# Patient Record
Sex: MALE | Race: WHITE | Employment: OTHER | URBAN - METROPOLITAN AREA
[De-identification: names, ages, dates, MRNs, and addresses within clinical notes are randomized per-mention and may not be internally consistent; named-entity substitution may affect disease eponyms.]

---

## 2022-07-08 ENCOUNTER — OFFICE VISIT (OUTPATIENT)
Dept: NEUROLOGY | Age: 77
End: 2022-07-08
Payer: MEDICARE

## 2022-07-08 VITALS
BODY MASS INDEX: 36.08 KG/M2 | WEIGHT: 252 LBS | TEMPERATURE: 97.9 F | SYSTOLIC BLOOD PRESSURE: 118 MMHG | HEART RATE: 72 BPM | OXYGEN SATURATION: 96 % | HEIGHT: 70 IN | DIASTOLIC BLOOD PRESSURE: 80 MMHG | RESPIRATION RATE: 18 BRPM

## 2022-07-08 DIAGNOSIS — E53.8 B12 DEFICIENCY: ICD-10-CM

## 2022-07-08 DIAGNOSIS — E55.9 VITAMIN D DEFICIENCY: ICD-10-CM

## 2022-07-08 DIAGNOSIS — M48.061 DEGENERATIVE LUMBAR SPINAL STENOSIS: ICD-10-CM

## 2022-07-08 DIAGNOSIS — E11.42 DIABETIC PERIPHERAL NEUROPATHY ASSOCIATED WITH TYPE 2 DIABETES MELLITUS (HCC): ICD-10-CM

## 2022-07-08 DIAGNOSIS — M96.1 LUMBAR POST-LAMINECTOMY SYNDROME: Primary | ICD-10-CM

## 2022-07-08 DIAGNOSIS — R47.1 DYSARTHRIA: ICD-10-CM

## 2022-07-08 DIAGNOSIS — M48.02 DEGENERATIVE CERVICAL SPINAL STENOSIS: ICD-10-CM

## 2022-07-08 DIAGNOSIS — G72.49 INFLAMMATORY AND IMMUNE MYOPATHIES: ICD-10-CM

## 2022-07-08 DIAGNOSIS — M54.16 LUMBAR BACK PAIN WITH RADICULOPATHY AFFECTING LEFT LOWER EXTREMITY: ICD-10-CM

## 2022-07-08 PROCEDURE — 99205 OFFICE O/P NEW HI 60 MIN: CPT | Performed by: PSYCHIATRY & NEUROLOGY

## 2022-07-08 PROCEDURE — 1123F ACP DISCUSS/DSCN MKR DOCD: CPT | Performed by: PSYCHIATRY & NEUROLOGY

## 2022-07-08 RX ORDER — PRAVASTATIN SODIUM 40 MG/1
1 TABLET ORAL DAILY
COMMUNITY
Start: 2022-01-06

## 2022-07-08 RX ORDER — LISINOPRIL 5 MG/1
TABLET ORAL DAILY
COMMUNITY
Start: 2021-12-09

## 2022-07-08 RX ORDER — UREA 10 %
500 LOTION (ML) TOPICAL DAILY
COMMUNITY

## 2022-07-08 RX ORDER — SILODOSIN 8 MG/1
8 CAPSULE ORAL
COMMUNITY

## 2022-07-08 RX ORDER — MELOXICAM 15 MG/1
15 TABLET ORAL DAILY
COMMUNITY

## 2022-07-08 RX ORDER — POTASSIUM CITRATE 10 MEQ/1
10 TABLET, EXTENDED RELEASE ORAL DAILY
COMMUNITY

## 2022-07-08 RX ORDER — DILTIAZEM HYDROCHLORIDE 360 MG/1
360 CAPSULE, EXTENDED RELEASE ORAL DAILY
COMMUNITY

## 2022-07-08 RX ORDER — LANOLIN ALCOHOL/MO/W.PET/CERES
800 CREAM (GRAM) TOPICAL DAILY
COMMUNITY
End: 2022-07-08

## 2022-07-08 RX ORDER — OMEPRAZOLE 20 MG/1
20 CAPSULE, DELAYED RELEASE ORAL DAILY
COMMUNITY

## 2022-07-08 NOTE — PROGRESS NOTES
Chief Complaint   Patient presents with    New Patient     referred by sister in law for slurred speech , sob, imbalance and drop foot - for 6-12 months - had back surgery at [facility] for severe spinal stenosis     1. Have you been to the ER, urgent care clinic since your last visit? Hospitalized since your last visit? No     2. Have you seen or consulted any other health care providers outside of the [address] [place] [address] since your last visit? Include any pap smears or colon screening.   No

## 2022-07-09 NOTE — PROGRESS NOTES
Consult  REFERRED BY:  None    CHIEF COMPLAINT: Difficulty with left foot drop, some slurred speech and swallowing difficulty, and recent lumbar laminectomy for severe spinal stenosis, and known severe cervical spondylosis at C3-4, and increased gait difficulty and balance problems and falls and COPD and shortness of breath. Subjective:     Deondre Hayes is a 76 y.o. right-handed  male we are seeing as a new patient, for evaluation of multiple neurological problems over the last year. The patient's had gait difficulty, and has apparently been evaluated extensively at [facility], where he had an MRI of the brain done November 2021 just showed mild atrophic changes and age-related changes, but no other significant lesions, had an MRI of the cervical spine done that shows severe spinal stenosis at C3-4 and 2021-year ago, and has an MRI of the lumbar spine done this year that showed severe spinal stenosis L2-3 and T12- L1 and L1-2, for which he had an extensive lumbar laminectomy done because of left foot drop May 2022 that did not get better after surgery, but the numbness in his feet did. He has some bowel continence problems and more frequent urinary urgency ever since he was treated with prostate cancer with radiation seeds. His neurosurgeon has advised him that it may take up to a year to get improvement after his surgery but he seems frustrated, and is not wearing his AFO splint like he should. He has had some difficulty with swallowing and choking and coughing, but has gotten better recently, but he still complains of shortness of breath but had a recent pulmonary function test done that shows he has moderate obstructive airway disease as a cause of his breathing problems done 2 days ago.   He also has had 1 week ago and extensive evaluation by ENT because of his swallowing difficulties, had a completely normal exam apparently, except for some reflux, and a double of his antacid therapy with Prilosec and Pepcid, but found no other structural lesion or swallowing problems or strictures on exam.  He has never had a clear history of stroke or TIA. He does not exercise much. He has had a previous left knee replacement. He has physical therapy being set up in [place]. He has been followed by a neurologist there for 6 months to a year and no other neurologic abnormality has been diagnosed. He had an EMG study done of his legs at [facility] and May 2022 and no evidence of motor neuron disease or any other problem was noted on the EMG except for his lumbar radiculopathies. On looking at his lab studies, they look relatively unremarkable except that his blood sugars have been running somewhat high. Has not had a recent hemoglobin A1c. They think his family doctor has done some type of muscle disease test just recently and they are going to try to get the results for us. I might think it is probably a myasthenia test.  His symptoms however do not fatigue and come and go which would be against myasthenia. He had no double vision either. He has prominent rotator cuff problems in his shoulders and some mild proximal weakness in the arms from that. In 2018 because of dizziness he had MRI of the brain and internal auditory canals that were essentially normal.  He does not complain much of weakness or numbness in his arms, manages his legs. His EMG may have suggested a mild length dependent sensorimotor polyneuropathy which could not be ruled out. It also showed severe L5-S1 radiculopathies worse in the left leg. He has not had a neuropathy or neuromuscular work-up, but I can see all of the [facility] notes. It sounds like his evaluation has been fairly extensive already. Past Medical History:   Diagnosis Date    [facility] obstructive [facility] disease) [facility]     Essential hypertension     Hyperlipemia     Neuropathy     Prostate cancer ([facility]       No past surgical history on file.   Family History Problem Relation Age of Onset    Other Mother         old age   Artem Byrd Other Father         old age      Social History     Tobacco Use    Smoking status: Never Smoker    Smokeless tobacco: Never Used   Substance Use Topics    Alcohol use: Yes     Alcohol/week: 2.0 standard drinks     Types: 2 Cans of beer per week     Comment: 2 beers a week         Current Outpatient Medications:     docusate sodium (COLACE PO), Take 2 Capsules by mouth daily. , Disp: , Rfl:     magnesium gluconate (Mag-G) 500 mg (27 mg elemental magnesium) tab tablet, Take 500 mg by mouth daily. , Disp: , Rfl:     pravastatin (PRAVACHOL) 40 mg tablet, Take 1 Tablet by mouth daily. , Disp: , Rfl:     lisinopriL (PRINIVIL, ZESTRIL) 5 mg tablet, daily. , Disp: , Rfl:     dilTIAZem ER (CARDIZEM CD) 360 mg capsule, Take 360 mg by mouth daily. , Disp: , Rfl:     silodosin (RAPAFLO) 8 mg capsule, Take 8 mg by mouth daily (with breakfast). , Disp: , Rfl:     potassium citrate (UROCIT-K10) 10 mEq (1,080 mg) TbER, Take 10 mEq by mouth daily. , Disp: , Rfl:     meloxicam (MOBIC) 15 mg tablet, Take 15 mg by mouth daily. , Disp: , Rfl:     omeprazole (PRILOSEC) 20 mg capsule, Take 20 mg by mouth daily. , Disp: , Rfl:     dorzolamide HCl/timolol maleat (DORZOLAMIDE-TIMOLOL OP), Apply 1 Drop to eye two (2) times a day. LEFT EYE, Disp: , Rfl:     trazodone HCl (TRAZODONE PO), Take 25 mg by mouth nightly. AS NEEDED, Disp: , Rfl:     TURMERIC PO, Take 1 Tablet by mouth daily. , Disp: , Rfl:         No Known Allergies   MRI Results (most recent):  No results found for this or any previous visit. No results found for this or any previous visit.     Review of Systems:  A comprehensive review of systems was negative except for: Constitutional: positive for fatigue and malaise  Eyes: positive for visual disturbance  Ears, nose, mouth, throat, and face: positive for hearing loss and Dizziness  Respiratory: positive for dyspnea on exertion, chronic bronchitis or Shortness of breath  Gastrointestinal: positive for dysphagia, dyspepsia, reflux symptoms, nausea and Dysarthria  Genitourinary: positive for frequency, nocturia and urinary incontinence  Musculoskeletal: positive for myalgias, arthralgias, stiff joints, neck pain, back pain and muscle weakness  Neurological: positive for dizziness, speech problems, paresthesia, coordination problems, gait problems and weakness  Behvioral/Psych: positive for anxiety and depression   Vitals:    07/08/22 1031   BP: 118/80   Pulse: 72   Resp: 18   Temp: 97.9 °F (36.6 °C)   TempSrc: Oral   SpO2: 96%   Weight: 252 lb (114.3 kg)   Height: 5' 10\" (1.778 m)     Objective:     I      NEUROLOGICAL EXAM:    Appearance: The patient is well developed, well nourished, provides a coherent history and is in no acute distress but obviously depressed. Mental Status: Oriented to time, place and person, and the president, cognitive function is normal and speech is fluent and no aphasia but has mild dysarthria. Mood and affect appropriate but depressed. Cranial Nerves:   Intact visual fields. Fundi are benign, disc are flat, no lesions seen on funduscopy. STEPHANI, EOM's full, no nystagmus, no ptosis. Facial sensation is normal. Corneal reflexes are not tested. Facial movement is symmetric. Hearing is abnormal bilaterally. Palate is midline with normal sternocleidomastoid and trapezius muscles are normal. Tongue is midline. Neck without meningismus or bruits  Temporal arteries are not tender or enlarged  TMJ areas are not tender on palpation   Motor:  4/5 strength in upper and lower proximal and distal muscles, except for mild bilateral deltoid weakness secondary to giving way secondary to his rotator cuff pain, and prominent weakness in his left lower extremity with moderate severe weakness on his dorsiflexors and mild weakness on his plantar flexors bilaterally. Normal bulk and tone except for the left lower extremity. No fasciculations.   Rapid alternating movement is symmetric and slow bilaterally   Reflexes:   Deep tendon reflexes 1+/4 and symmetrical except for absent ankle jerks bilaterally. No babinski or clonus present   Sensory:   Abnormal to touch, pinprick and vibration and temperature in both feet to above the ankles. .  DSS is intact   Gait:  Abnormal gait for patient's age as he has to move very slowly and use a cane because of his prominent left leg weakness. Tremor:   No tremor noted. Cerebellar: Moderately abnormal cerebellar signs present on Romberg and tandem testing and finger-nose-finger exam.   Neurovascular:  Normal heart sounds and regular rhythm, peripheral pulses decreased, and no carotid bruits. Assessment:       ICD-10-CM ICD-9-CM    1. Lumbar post-laminectomy syndrome  M96.1 722.83 AD COMPREHENSIVE PLUS PANEL      ANTINEURONAL CELL AB      CK      IMMUNOELECTROPHORESIS (IMMUNOFIX.)      SED RATE (ESR)      MRI CERV SPINE WO CONT      REFERRAL TO SPEECH THERAPY   2. Lumbar back pain with radiculopathy affecting left lower extremity  M54.16 724.4 AD COMPREHENSIVE PLUS PANEL      ANTINEURONAL CELL AB      CK      IMMUNOELECTROPHORESIS (IMMUNOFIX.)      SED RATE (ESR)      MRI CERV SPINE WO CONT      REFERRAL TO SPEECH THERAPY   3. Degenerative lumbar spinal stenosis  M48.061 724.02 AD COMPREHENSIVE PLUS PANEL      ANTINEURONAL CELL AB      CK      IMMUNOELECTROPHORESIS (IMMUNOFIX.)      SED RATE (ESR)      MRI CERV SPINE WO CONT      REFERRAL TO SPEECH THERAPY   4. Degenerative cervical spinal stenosis, C3-4  M48.02 723.0 AD COMPREHENSIVE PLUS PANEL      ANTINEURONAL CELL AB      CK      IMMUNOELECTROPHORESIS (IMMUNOFIX.)      SED RATE (ESR)      MRI CERV SPINE WO CONT      REFERRAL TO SPEECH THERAPY   5.  Dysarthria  R47.1 784.51 AD COMPREHENSIVE PLUS PANEL      ANTINEURONAL CELL AB      CK      IMMUNOELECTROPHORESIS (IMMUNOFIX.)      SED RATE (ESR)      MRI CERV SPINE WO CONT      REFERRAL TO SPEECH THERAPY 6. B12 deficiency  E53.8 266.2 AD COMPREHENSIVE PLUS PANEL      ANTINEURONAL CELL AB      CK      VITAMIN B12 & FOLATE      IMMUNOELECTROPHORESIS (IMMUNOFIX.)      SED RATE (ESR)      MRI CERV SPINE WO CONT      REFERRAL TO SPEECH THERAPY   7. Diabetic peripheral neuropathy associated with type 2 diabetes mellitus (HCC)  E11.42 250.60 AD COMPREHENSIVE PLUS PANEL     357.2 ANTINEURONAL CELL AB      CK      IMMUNOELECTROPHORESIS (IMMUNOFIX.)      HEMOGLOBIN A1C WITH EAG      SED RATE (ESR)      MRI CERV SPINE WO CONT      REFERRAL TO SPEECH THERAPY   8. Inflammatory and immune myopathies  G72.49 359.79 AD COMPREHENSIVE PLUS PANEL      ANTINEURONAL CELL AB      CK      IMMUNOELECTROPHORESIS (IMMUNOFIX.)      SED RATE (ESR)      MRI CERV SPINE WO CONT      REFERRAL TO SPEECH THERAPY   9. Vitamin D deficiency  E55.9 268.9 AD COMPREHENSIVE PLUS PANEL      ANTINEURONAL CELL AB      CK      VITAMIN D, 25 HYDROXY      IMMUNOELECTROPHORESIS (IMMUNOFIX.)      SED RATE (ESR)      MRI CERV SPINE WO CONT      REFERRAL TO SPEECH THERAPY     Active Problems:    * No active hospital problems. *      Plan:     Patient with multiple neurological problems and complaints and symptoms, and hard to put under one unifying diagnosis. As he has left leg weakness from his previous severe spinal stenosis in the lumbar area, that is a little better as far as the sensory loss, but no improvement in strength yet, we reinforced the neurosurgeons believe that he will have to give this time, up to a year, to allow recovery. In the interim he should start his physical therapy as ordered he is going to do that. He may need a repeat MRI scan of his lumbar spine in several months, to see if there is any significant scar tissue or other structural lesions that could be causing his leg not to get better. We encouraged the patient to try to use his AFO brace to help stabilize his gait.   He seems to have some generalized weakness, and probably needs repeat MRI of his cervical spine to see if his myelopathy has gotten worse there and see whether or not he is a surgical candidate for cervical spine. He has a known cervical stenosis and myelopathy that is probably clinically symptomatic and significant. He is advised he should not fall. Patient has had an EMG study done and shows no evidence of motor neuron disease, and his exam shows no fasciculations or prominent atrophy other than his left leg muscles, so there is no clear evidence of motor neuron disease, but in view of his swallowing and speech difficulty, early bulbar ALS is always a possibility but we will just have to wait with time to see what his symptoms develop, he has no atrophy of his tongue or weakness of his oropharyngeal muscles to suggest motor neuron disease. His ENT examination has been relatively thorough and finds no other cause of his symptoms either. That was just done 1 week ago. He had a thorough endoscopy done with it. Because of his slight speech difficulty we will send him to speech therapy which may give him some swallowing techniques and speech techniques to try to improve his function while we follow him. He appears to have a mild neuropathy, and complete neuropathy work-up was done, and I suspect he might have a latent diabetic state because of his elevated blood sugar noted on his lab work from [facility]. He has no signs of stroke or focal brain disease, but if his symptoms persist he may need to repeat his MRI of the brain eventually. 70-minute spent with the patient and his wife going over all of this in detail, reviewing all his records from [facility], reviewing all the records his wife brought also, and discussing his diagnosis prognosis and treatment options with them in detail, and I tried to reassure them that his work-up at [facility] was fairly extensive and that he is a university facility, highly regarded, so we will not repeat the studies they have done.   Follow-up in 3 months time or earlier as need be after he starts and finishes his physical therapy. He is encouraged to stay mentally and physically active, exercise regular, eat a Mediterranean type diet and try to exercise more.     Signed By: Vibha Coon MD     July 8, 2022       CC: None  FAX: None

## 2022-07-11 ENCOUNTER — TELEPHONE (OUTPATIENT)
Dept: NEUROLOGY | Age: 77
End: 2022-07-11

## 2022-07-11 NOTE — TELEPHONE ENCOUNTER
Pt looked at blood work results on [address] States folate level is high. Wants to know if this is something he should be concerned about.  Please call [phone]

## 2022-07-11 NOTE — TELEPHONE ENCOUNTER
I called the patient wife, explained the MRI of the cervical spine that we are looking at, not his lumbar spine, and since has been a year and a half since that x-ray she will go ahead and do it, and she will have the neurosurgeon try to draw the myasthenia gravis panel that he is going to see on Wednesday, and because of the high folic acid level she is to discontinue the B complex vitamin just give him a One-A-Day vitamin to keep the B12 up.

## 2022-07-11 NOTE — TELEPHONE ENCOUNTER
Pt's wife, Amy Estrella, is concerned about Pt getting another Mri so soon after he had one last month. She stated that Pt has f/u w Neurosurgeon on Thursday and wants to know if they check with with them first before we get another MRI done. She also stated that Pt's PCP did two muscle inflammatory tests, none for myasthenia gravis. Amy Estrella wants to know if dr Juan A White can send an order to get that blood work done.

## 2022-07-12 ENCOUNTER — DOCUMENTATION ONLY (OUTPATIENT)
Dept: NEUROLOGY | Age: 77
End: 2022-07-12

## 2022-07-12 ENCOUNTER — HOSPITAL ENCOUNTER (OUTPATIENT)
Dept: MRI IMAGING | Age: 77
Discharge: HOME | End: 2022-07-12
Attending: PSYCHIATRY & NEUROLOGY
Payer: MEDICARE

## 2022-07-12 ENCOUNTER — TELEPHONE (OUTPATIENT)
Dept: NEUROLOGY | Age: 77
End: 2022-07-12

## 2022-07-12 DIAGNOSIS — E53.8 B12 DEFICIENCY: ICD-10-CM

## 2022-07-12 DIAGNOSIS — G72.49 INFLAMMATORY AND IMMUNE MYOPATHIES: ICD-10-CM

## 2022-07-12 DIAGNOSIS — M96.1 LUMBAR POST-LAMINECTOMY SYNDROME: ICD-10-CM

## 2022-07-12 DIAGNOSIS — R47.1 DYSARTHRIA: ICD-10-CM

## 2022-07-12 DIAGNOSIS — E11.42 DIABETIC PERIPHERAL NEUROPATHY ASSOCIATED WITH TYPE 2 DIABETES MELLITUS (HCC): ICD-10-CM

## 2022-07-12 DIAGNOSIS — R47.1 DYSARTHRIA: Primary | ICD-10-CM

## 2022-07-12 DIAGNOSIS — M48.061 DEGENERATIVE LUMBAR SPINAL STENOSIS: ICD-10-CM

## 2022-07-12 DIAGNOSIS — M48.02 DEGENERATIVE CERVICAL SPINAL STENOSIS: ICD-10-CM

## 2022-07-12 DIAGNOSIS — E55.9 VITAMIN D DEFICIENCY: ICD-10-CM

## 2022-07-12 DIAGNOSIS — M54.16 LUMBAR BACK PAIN WITH RADICULOPATHY AFFECTING LEFT LOWER EXTREMITY: ICD-10-CM

## 2022-07-12 PROCEDURE — 72141 MRI NECK SPINE W/O DYE: CPT

## 2022-07-12 NOTE — TELEPHONE ENCOUNTER
Pt was able to get a MRI appt at [place] today. There is a blood draw office [place] the [place]. Can Dr. Baldo Richards put in an order for a MG panel so they can get that done today as well. It is a BS blood yesenia site so orders just need to be put in CC.

## 2022-07-13 NOTE — PROGRESS NOTES
I called the patient, advised him that his MRI was the same by looking at the report, he does have significant spinal stenosis at the C3-4, but no cord signal abnormality, and his age and recent back surgery, probably the best thing is to try physical therapy, and have the neurosurgeon who he is going to see tomorrow look at the films, and see what they say, but a trial of physical therapy seems with well to me

## 2022-07-15 ENCOUNTER — TELEPHONE (OUTPATIENT)
Dept: NEUROLOGY | Age: 77
End: 2022-07-15

## 2022-07-15 NOTE — TELEPHONE ENCOUNTER
I called the patient wife, no answer, so left message his lab test were all okay except for the folic acid level being a little high but he will pass that in his urine, and he is a latent diabetic with a hemoglobin A1c of 6.23 needs to watch his blood sugars but does not need treatment for that other than exercise and diet, and to call me back on Monday if she has any questions

## 2022-07-15 NOTE — TELEPHONE ENCOUNTER
Spoke with Abida Bañuelos on [address] [place] [address] Verified patient with two identifiers. Dr Reyes Quan has not reviewed yet and some labs are still pending will call once reviewed by Dr Reyes Quan.  Please advise thanks

## 2022-07-15 NOTE — TELEPHONE ENCOUNTER
Patient's wife, Sara Ratliff, requested a call from Nurse or Dr to discuss blood test results, stated they need some clarification so they can have a better understanding.   [phone]

## 2022-07-25 ENCOUNTER — TELEPHONE (OUTPATIENT)
Dept: NEUROLOGY | Age: 77
End: 2022-07-25

## 2022-07-25 NOTE — TELEPHONE ENCOUNTER
Leo Riggins would like pt to have a out patient modified barium swallow study with speech therapy. Please have Dr. Merari English call to discuss option.  [phone]

## 2022-07-26 NOTE — TELEPHONE ENCOUNTER
Maikel Edmonds called back again. Stated that Patient is all scheduled for thursday, just waiting on the order.

## 2022-07-27 DIAGNOSIS — R13.12 OROPHARYNGEAL DYSPHAGIA: Primary | ICD-10-CM

## 2022-07-27 NOTE — TELEPHONE ENCOUNTER
Faxed  Jono godinez order to [facility] with [facility], ph [phone], fax # [phone]. Confirmation received.

## 2022-07-27 NOTE — TELEPHONE ENCOUNTER
Fabienne nazario/ Mark rodriguez called. Pt has appt at 10:15 tomorrow. They need the order for the barium swallow. Please fax to [phone]    Please call Flakita Headings with any questions.  [phone]

## 2022-07-27 NOTE — TELEPHONE ENCOUNTER
Asking for you to enter order for modified barium swallow with speech therapy, scheduled tomorrow at [facility].

## 2022-08-09 ENCOUNTER — TELEPHONE (OUTPATIENT)
Dept: NEUROLOGY | Age: 77
End: 2022-08-09

## 2022-08-09 NOTE — TELEPHONE ENCOUNTER
Faustina Bateman faxed over medical release form to get pt's records sent to their ENT dr. Please keep an eye out for fax and send to [employer] when it comes.

## 2022-08-12 ENCOUNTER — TELEPHONE (OUTPATIENT)
Dept: NEUROLOGY | Age: 77
End: 2022-08-12

## 2022-08-12 NOTE — TELEPHONE ENCOUNTER
Faxed order for Speech therapy, etc to [facility], fax # [phone]. Confirmation received, sent for scanning.

## 2022-08-26 ENCOUNTER — TELEPHONE (OUTPATIENT)
Dept: NEUROLOGY | Age: 77
End: 2022-08-26

## 2022-08-26 NOTE — TELEPHONE ENCOUNTER
Plan of Care for Faustina Steven, ph # [phone], fax # [phone] with Dr. Lenore Sow signature faxed to [facility]. Confirmation received. Venus Aguilar

## 2022-11-14 ENCOUNTER — TELEPHONE (OUTPATIENT)
Dept: NEUROLOGY | Age: 77
End: 2022-11-14

## 2022-11-14 NOTE — TELEPHONE ENCOUNTER
Patient and his sister are having a hard time getting daniel funds for Pt's Radicava-ORS for his ALS and would like to know if Dr Tai Fischer has any ALS information that could help them.   Please call [phone]

## 2022-11-15 NOTE — TELEPHONE ENCOUNTER
Tried to return call but was busy. Will try later. Last visit on 7/8/22 with Marycarmen Pleitez.  Has not seen  and no ALS Dx.

## 2022-11-22 NOTE — TELEPHONE ENCOUNTER
Tried to call pt again but phone number was busy. Will wait on call back from pt at this time. Will close out this encounter.